# Patient Record
Sex: FEMALE
[De-identification: names, ages, dates, MRNs, and addresses within clinical notes are randomized per-mention and may not be internally consistent; named-entity substitution may affect disease eponyms.]

---

## 2021-09-28 PROBLEM — Z00.00 ENCOUNTER FOR PREVENTIVE HEALTH EXAMINATION: Status: ACTIVE | Noted: 2021-09-28

## 2021-10-05 ENCOUNTER — NON-APPOINTMENT (OUTPATIENT)
Age: 56
End: 2021-10-05

## 2021-10-07 ENCOUNTER — APPOINTMENT (OUTPATIENT)
Dept: ORTHOPEDIC SURGERY | Facility: CLINIC | Age: 56
End: 2021-10-07
Payer: COMMERCIAL

## 2021-10-07 VITALS
WEIGHT: 155 LBS | HEIGHT: 64 IN | BODY MASS INDEX: 26.46 KG/M2 | DIASTOLIC BLOOD PRESSURE: 74 MMHG | SYSTOLIC BLOOD PRESSURE: 120 MMHG | HEART RATE: 54 BPM

## 2021-10-07 DIAGNOSIS — Z72.3 LACK OF PHYSICAL EXERCISE: ICD-10-CM

## 2021-10-07 DIAGNOSIS — Z78.9 OTHER SPECIFIED HEALTH STATUS: ICD-10-CM

## 2021-10-07 PROCEDURE — 99204 OFFICE O/P NEW MOD 45 MIN: CPT

## 2021-10-07 PROCEDURE — 73030 X-RAY EXAM OF SHOULDER: CPT | Mod: LT

## 2021-10-07 NOTE — HISTORY OF PRESENT ILLNESS
[de-identified] : Ms. Galvan is a 56-year-old right-hand-dominant woman who comes in with pain in her left shoulder from a fall on the arm that occurred September 17, 2021 when she tripped on the sidewalk and fell.\par Pain had been 10 out of 10 and constant and sharp.\par She had gone to [facility] and had x-rays which showed no fractures.\par There was no bruising in her left shoulder but there was a little bit on the right but that resolved and the right one feels fine.\par Pain is worse lying down and moving her arms.  It is better with rest and medication.  Last few weeks the pain has gotten somewhat better.  At rest with her arm at the side there is little pain or can be some mild or moderate pain at times.  Pain is worse as she lifts her arm overhead.\par She has been taking ibuprofen 800 mg every 6 hours and Tylenol 500 mg every 6 hours for pain.\par \par She also complains of more chronic bilateral knee pain.  She has had x-rays in the past which showed arthritis.\par She has not done any treatment for her knees.  No prior injuries.  Pain is worse with stairs and bending.  Another doctor told her not to kneel and to avoid stairs but she has not tried therapy or any injections or other treatments.

## 2021-10-07 NOTE — ASSESSMENT
[FreeTextEntry1] : 56-year-old right-hand-dominant woman fell on her left arm about 3 weeks ago and has persistent although improving pain and function.\par She likely strained her rotator cuff tendons.\par At this point she is getting back good motion and there is relatively good strength.\par I recommended giving it another 3 to 4 weeks and starting some physical therapy and doing some home exercises to see if the pain resolves completely and she gets back full motion and strength.  If not then I would have her go for an MRI.\par I suggested cutting back on the ibuprofen to about 400 mg 3 times daily with meals and take 2 Tylenol 3 times a day if needed to try to decrease risk of ulcer.\par Heat and ice as needed.\par \par In her knees she has moderate osteoarthritis of the patellofemoral joints.\par This is more of a chronic issue.\par I gave her home exercises and showed her exercises to do to strengthen.  Heat and ice.  Kneeling and deep bending and stairs will be more likely to aggravate her pain.  She can do what is not painful.  It is important to keep her weight down.  The Tylenol and ibuprofen may be helpful.\par When her shoulder is improving then we may have her do therapy for the knees.  We can consider steroid and hyaluronic acid injections in the future as well.\par Follow-up in 3 weeks\par

## 2021-10-07 NOTE — PHYSICAL EXAM
[Rad] : radial 2+ and symmetric bilaterally [Normal RUE] : Right Upper Extremity: No scars, rashes, lesions, ulcers, skin intact [Normal LUE] : Left Upper Extremity: No scars, rashes, lesions, ulcers, skin intact [Normal Touch] : sensation intact for touch [Normal] : Oriented to person, place, and time, insight and judgement were intact and the affect was normal [UE/LE] : Sensory: Intact in bilateral upper & lower extremities [de-identified] : Left shoulder\par No edema, ecchymoses, erythema, deformity.\par Mild tenderness around the shoulder but no significant point tenderness.\par Active forward elevation is to about 165 degrees with pain on upper level of elevation.  No significant shoulder shrug or drop arm.\par Passively there is about 65 degrees external rotation and internal rotation is to L4 with pain at extremes of motion.\par 5 -/5 supraspinatus and external rotation and 5/5 internal rotation and biceps.\par Pain with Neer and Aguilar.\par Normal neurovascular exam distally\par \par Knees\par No effusion.\par Tender patella facets.\par Range of motion 0 to 125 degrees with significant patellofemoral crepitus.\par Negative Sheng.\par 1 a Lachman.  Negative anterior posterior drawer.  Normal varus and valgus laxity. [de-identified] : \par I reviewed x-rays that she brought in [facility] internal and external views and lateral view showed no fractures.  The views are not ideal however.\par New x-rays today [facility], Y lateral and axillary views showed no fractures.  Head is well centered.  No degenerative changes\par \par I reviewed x-rays from [facility] radiology on September 20, 2021 showing bilateral patellofemoral joint space degenerative changes with joint space narrowing and osteophytes of moderate severity.

## 2021-10-14 ENCOUNTER — APPOINTMENT (OUTPATIENT)
Dept: ORTHOPEDIC SURGERY | Facility: CLINIC | Age: 56
End: 2021-10-14

## 2021-10-28 ENCOUNTER — APPOINTMENT (OUTPATIENT)
Dept: ORTHOPEDIC SURGERY | Facility: CLINIC | Age: 56
End: 2021-10-28
Payer: COMMERCIAL

## 2021-10-28 DIAGNOSIS — M23.203 DERANGEMENT OF UNSPECIFIED MEDIAL MENISCUS DUE TO OLD TEAR OR INJURY, RIGHT KNEE: ICD-10-CM

## 2021-10-28 DIAGNOSIS — M23.204 DERANGEMENT OF UNSPECIFIED MEDIAL MENISCUS DUE TO OLD TEAR OR INJURY, LEFT KNEE: ICD-10-CM

## 2021-10-28 PROCEDURE — 99214 OFFICE O/P EST MOD 30 MIN: CPT

## 2021-10-28 NOTE — PHYSICAL EXAM
[UE/LE] : Sensory: Intact in bilateral upper & lower extremities [Rad] : radial 2+ and symmetric bilaterally [Normal RUE] : Right Upper Extremity: No scars, rashes, lesions, ulcers, skin intact [Normal LUE] : Left Upper Extremity: No scars, rashes, lesions, ulcers, skin intact [Normal Touch] : sensation intact for touch [Normal] : Oriented to person, place, and time, insight and judgement were intact and the affect was normal [de-identified] : Left shoulder\par No edema, ecchymoses, erythema, deformity.\par Mild tenderness around the shoulder but no significant point tenderness.\par Active forward elevation is to about 170 degrees with pain on upper level of elevation.  No significant shoulder shrug or drop arm.  120 degrees abduction of the shoulder and internal rotation to L1\par Passively 90 degrees abduction there is 80 degrees external rotation and 20 degrees internal rotation\par 5 -/5 supraspinatus and external rotation and 5/5 internal rotation and biceps.\par Pain with Neer and Aguilar.\par Normal neurovascular exam distally\par \par Knees\par No effusion.\par Tender patella facets right greater than left\par Range of motion 0 to 125 degrees with significant patellofemoral crepitus.\par Mild pain with Sheng right knee\par 1 a Lachman.  Negative anterior posterior drawer.  Normal varus and valgus laxity.\par Intact extensor mechanism.\par Normal neurovascular exam distally [de-identified] : \par Left shoulder x-rays 9/18/21 [facility] internal and external views and lateral view showed no fractures.  The views are not ideal however.\par Left shoulder x-rays 10/7/21 [facility], Y lateral and axillary views showed no fractures.  Head is well centered.  No degenerative changes\par \par x-rays from [facility] on September 20, 2021 showed bilateral patellofemoral joint space degenerative changes with joint space narrowing and osteophytes of moderate severity.\par

## 2021-10-28 NOTE — ASSESSMENT
[FreeTextEntry1] : 56-year-old right-hand-dominant woman fell on her left arm about 5-6 weeks ago and likely strained her rotator cuff tendons.\par Her shoulder has partially improved but she still has some pain and weakness.  She will continue with the therapy but if its not improving enough I would like for her to get an MRI at this point to rule out rotator cuff tear.  I think likely there is at least a partial tear.  If there is a full-thickness tear then surgery may be considered.\par We will discuss further once I have the MRI results.\par She can take the ibuprofen as needed for the shoulder and knee pain.  In her knees she has patellofemoral arthritis.  Heat and ice as needed.  She was given home exercises and she will do therapy for the knees when she finishes it for the shoulders.  She should work on strengthening around the knees.  We talked about considering steroid injections and may consider hyaluronic acid injections in the future as other options.  Arthritis will typically progress over time and ultimately if it becomes severe then replacement can be considered.\par Follow-up in 6 weeks.

## 2021-10-28 NOTE — HISTORY OF PRESENT ILLNESS
[de-identified] : Ms. Galvan is a 56-year-old right-hand-dominant woman who comes in for f/u for pain in her left shoulder from a fall on the arm that occurred September 17, 2021 when she tripped on the sidewalk and fell and also c/o chronic bilateral knee pain.\par Shoulder pain has decreased and her function has improved somewhat since I last saw her.  She started physical therapy and has done 4 sessions so far with improving range of motion.  She still has pain reaching out to the side and her shoulder feels like it catches and gets stuck at times.  She stopped taking the ibuprofen 800 mg tablets.  She has discomfort at night that can keep her awake.\par \par She also complains of chronic bilateral knee pain.  She has had x-rays recently which showed arthritis Pablo femoral joints.  Stairs are difficult and her legs feel heavy going up and down stairs.  Sometimes she helps to lift them.  She has not been taking anything recently for her knees but had taken the ibuprofen.\par She has not done any treatment for her knees.  No prior injuries.  Never had any injections in the knees

## 2021-10-28 NOTE — REASON FOR VISIT
[Follow-Up Visit] : a follow-up visit for [Shoulder Pain] : shoulder pain [Knee Pain] : knee pain [Family Member] : family member

## 2021-12-09 ENCOUNTER — APPOINTMENT (OUTPATIENT)
Dept: ORTHOPEDIC SURGERY | Facility: CLINIC | Age: 56
End: 2021-12-09

## 2021-12-21 ENCOUNTER — NON-APPOINTMENT (OUTPATIENT)
Age: 56
End: 2021-12-21

## 2022-01-13 ENCOUNTER — APPOINTMENT (OUTPATIENT)
Dept: ORTHOPEDIC SURGERY | Facility: CLINIC | Age: 57
End: 2022-01-13
Payer: COMMERCIAL

## 2022-01-13 PROCEDURE — 99215 OFFICE O/P EST HI 40 MIN: CPT

## 2022-01-13 RX ORDER — IBUPROFEN 800 MG/1
800 TABLET, FILM COATED ORAL
Refills: 0 | Status: COMPLETED | COMMUNITY
End: 2022-01-13

## 2022-01-13 RX ORDER — NAPROXEN 500 MG/1
500 TABLET ORAL
Qty: 40 | Refills: 0 | Status: ACTIVE | COMMUNITY
Start: 2022-01-13 | End: 1900-01-01

## 2022-01-13 NOTE — PHYSICAL EXAM
[de-identified] : Left shoulder\par No edema, ecchymoses, erythema, deformity.\par Mild tenderness around the shoulder but no significant point tenderness.\par Active forward elevation is to about 175 degrees with pain on upper level of elevation.  No significant shoulder shrug or drop arm.  150 degrees abduction of the shoulder and internal rotation to T12 with pain\par Passively 90 degrees abduction there is 85 degrees external rotation and 20 degrees internal rotation\par 5 -/5 supraspinatus and 5/5 external rotation and 5/5 internal rotation and biceps.\par Pain with Neer and Aguilar.\par Normal neurovascular exam distally\par \par Knees\par No effusion.\par Tender patella facets right greater than left\par Range of motion 0 to 125-130 degrees with significant patellofemoral crepitus.\par Mild pain with Sheng right knee\par 1A Lachman.  Negative anterior posterior drawer.  Normal varus and valgus laxity.\par Intact extensor mechanism.\par Normal neurovascular exam distally [de-identified] : \par Left shoulder x-rays 9/18/21 [facility] internal and external views and lateral view showed no fractures.  The views are not ideal however.\par Left shoulder x-rays 10/7/21 [facility], Y lateral and axillary views showed no fractures.  Head is well centered.  No degenerative changes\par \par x-rays from [facility] on September 20, 2021 showed bilateral patellofemoral joint space degenerative changes with joint space narrowing and osteophytes of moderate severity.\par \par MRI of the left shoulder on December 14, 2021 showed area of focal contusion anterior greater tuberosity, moderate tendinosis supraspinatus with fraying, moderate infraspinatus tendinosis, mild biceps tendinosis, small glenoid rim osteophytes, mild to moderate AC joint arthrosis, mild subacromial bursitis and changes that could be consistent with adhesive capsulitis

## 2022-01-13 NOTE — ASSESSMENT
[FreeTextEntry1] : 56-year-old right-hand-dominant woman fell on her left arm almost 4 mos ago and likely strained her rotator cuff tendons.\par The MRI of the shoulder did show a bone bruise and rotator cuff tendinopathy with fraying but no significant tears.  There is some arthritis mild in the glenohumeral joint and also AC joint.  There is some bursitis.\par There was no tears or any surgical indication based on the MRI and clinically there is no evidence of adhesive capsulitis at this time.  Her motion has remained good.  The therapy seemed to help some but then also aggravates the shoulder pain with too much exercise.  She is going to stop the therapy on the shoulder but she will start therapy for her knee pain where she has bilateral patellofemoral arthritis.  She was given home exercises for the knees and the shoulder.  She should avoid any painful activities.  She can ride a stationary bike for the knees which would be good.\par Since ibuprofen did not help I prescribed naproxen 500 mg twice daily with meals to take for a couple weeks as an anti-inflammatory to see if this helps.  Heat and ice as needed.  She can use various over-the-counter patches.\par I did offer to do a steroid injection in her left shoulder today since it has remained painful but she had reservations.  She is going to speak to her .\par In addition for the knees with the arthritis if they do not get better we could consider steroid injection.  Also hyaluronic acid injections are something to consider in the future.  I did explain that arthritis can get worse over time and if it severe ultimately knee replacement may be considered but that is not a consideration at this time.\par She will follow-up in about 8 weeks.

## 2022-01-13 NOTE — REASON FOR VISIT
[Knee Pain] : knee pain [[employer] ] : provided by [employer]   [Time Spent: ____ minutes] : Total time spent using  services: [unfilled] minutes. The patient's primary language is not English thus required  services. [TWNoteComboBox1] : Ecuadorean

## 2022-01-13 NOTE — HISTORY OF PRESENT ILLNESS
[de-identified] : Ms. Galvan is a 56-year-old right-hand-dominant woman who comes in for f/u for pain in her left shoulder from a fall on the arm that occurred September 17, 2021 when she tripped on the sidewalk and fell and also c/o chronic bilateral knee pain.\par Shoulder pain has decreased and her function has improved somewhat since I last saw her.  She started physical therapy and has done 4 sessions so far with improving range of motion.  She still has pain reaching out to the side and her shoulder feels like it catches and gets stuck at times.  She stopped taking the ibuprofen 800 mg tablets.  She has discomfort at night that can keep her awake.\par \par She also complains of chronic bilateral knee pain.  She has had x-rays recently which showed arthritis Pablo femoral joints.  Stairs are difficult and her legs feel heavy going up and down stairs.  Sometimes she helps to lift them.  She has not been taking anything recently for her knees but had taken the ibuprofen.\par She has not done any treatment for her knees.  No prior injuries.  Never had any injections in the knees

## 2022-03-08 PROBLEM — S46.012A: Status: ACTIVE | Noted: 2021-10-07

## 2022-03-08 PROBLEM — S40.012A CONTUSION OF LEFT SHOULDER, INITIAL ENCOUNTER: Status: ACTIVE | Noted: 2021-10-07

## 2022-03-08 PROBLEM — M67.912 TENDINOPATHY OF LEFT ROTATOR CUFF: Status: ACTIVE | Noted: 2022-01-10

## 2022-03-08 PROBLEM — M75.42 IMPINGEMENT SYNDROME OF LEFT SHOULDER: Status: ACTIVE | Noted: 2022-01-13

## 2022-03-08 PROBLEM — M17.11 PATELLOFEMORAL ARTHRITIS OF RIGHT KNEE: Status: ACTIVE | Noted: 2021-10-07

## 2022-03-08 PROBLEM — M17.12 PATELLOFEMORAL ARTHRITIS OF LEFT KNEE: Status: ACTIVE | Noted: 2021-10-07

## 2022-03-08 NOTE — PHYSICAL EXAM
[UE/LE] : Sensory: Intact in bilateral upper & lower extremities [Rad] : radial 2+ and symmetric bilaterally [Normal RUE] : Right Upper Extremity: No scars, rashes, lesions, ulcers, skin intact [Normal LUE] : Left Upper Extremity: No scars, rashes, lesions, ulcers, skin intact [Normal Touch] : sensation intact for touch [Normal] : Oriented to person, place, and time, insight and judgement were intact and the affect was normal [de-identified] : Left shoulder\par No edema, ecchymoses, erythema, deformity.\par Mild tenderness around the shoulder but no significant point tenderness.\par Active forward elevation is to about 175 degrees with pain on upper level of elevation.  No significant shoulder shrug or drop arm.  150 degrees abduction of the shoulder and internal rotation to T12 with pain\par Passively 90 degrees abduction there is 85 degrees external rotation and 20 degrees internal rotation\par 5 -/5 supraspinatus and 5/5 external rotation and 5/5 internal rotation and biceps.\par Pain with Neer and Aguilar.\par Normal neurovascular exam distally\par \par Knees\par No effusion.\par Tender patella facets right greater than left\par Range of motion 0 to 125-130 degrees with significant patellofemoral crepitus.\par Mild pain with Sheng right knee\par 1A Lachman.  Negative anterior posterior drawer.  Normal varus and valgus laxity.\par Intact extensor mechanism.\par Normal neurovascular exam distally [de-identified] : \par Left shoulder x-rays 9/18/21 [facility] internal and external views and lateral view showed no fractures.  The views are not ideal however.\par Left shoulder x-rays 10/7/21 [facility], Y lateral and axillary views showed no fractures.  Head is well centered.  No degenerative changes\par \par x-rays from [facility] on September 20, 2021 showed bilateral patellofemoral joint space degenerative changes with joint space narrowing and osteophytes of moderate severity.\par \par MRI of the left shoulder on December 14, 2021 showed area of focal contusion anterior greater tuberosity, moderate tendinosis supraspinatus with fraying, moderate infraspinatus tendinosis, mild biceps tendinosis, small glenoid rim osteophytes, mild to moderate AC joint arthrosis, mild subacromial bursitis and changes that could be consistent with adhesive capsulitis

## 2022-03-08 NOTE — ASSESSMENT
[FreeTextEntry1] : 56-year-old right-hand-dominant woman fell on her left arm about 6 mos ago and likely strained her rotator cuff tendons.\par The MRI of the shoulder did show a bone bruise and rotator cuff tendinopathy with fraying but no significant tears.  There is some arthritis mild in the glenohumeral joint and also AC joint.  There is some bursitis.\par There was no tears or any surgical indication based on the MRI and clinically there is no evidence of adhesive capsulitis at this time. \par In addition for the knees with the arthritis if they do not get better we could consider steroid injection.  Also hyaluronic acid injections are something to consider in the future.  I did explain that arthritis can get worse over time and if it severe ultimately knee replacement may be considered but that is not a consideration at this time.\par She will follow-up in about 8 weeks.

## 2022-03-08 NOTE — HISTORY OF PRESENT ILLNESS
[de-identified] : Ms. Galvan is a 56-year-old right-hand-dominant woman who comes in for f/u for pain in her left shoulder from a fall on the arm that occurred September 17, 2021 when she tripped on the sidewalk and fell and also c/o chronic bilateral knee pain.\par Shoulder pain \par She also complains of chronic bilateral knee pain.  \par She took Naproxyn

## 2022-03-10 ENCOUNTER — APPOINTMENT (OUTPATIENT)
Dept: ORTHOPEDIC SURGERY | Facility: CLINIC | Age: 57
End: 2022-03-10

## 2022-03-10 DIAGNOSIS — M17.12 UNILATERAL PRIMARY OSTEOARTHRITIS, LEFT KNEE: ICD-10-CM

## 2022-03-10 DIAGNOSIS — M17.11 UNILATERAL PRIMARY OSTEOARTHRITIS, RIGHT KNEE: ICD-10-CM

## 2022-03-10 DIAGNOSIS — M67.912 UNSPECIFIED DISORDER OF SYNOVIUM AND TENDON, LEFT SHOULDER: ICD-10-CM

## 2022-03-10 DIAGNOSIS — S46.012A STRAIN OF MUSCLE(S) AND TENDON(S) OF THE ROTATOR CUFF OF LEFT SHOULDER, INITIAL ENCOUNTER: ICD-10-CM

## 2022-03-10 DIAGNOSIS — S40.012A CONTUSION OF LEFT SHOULDER, INITIAL ENCOUNTER: ICD-10-CM

## 2022-03-10 DIAGNOSIS — M75.42 IMPINGEMENT SYNDROME OF LEFT SHOULDER: ICD-10-CM
